# Patient Record
Sex: MALE | Race: WHITE | Employment: STUDENT | URBAN - METROPOLITAN AREA
[De-identification: names, ages, dates, MRNs, and addresses within clinical notes are randomized per-mention and may not be internally consistent; named-entity substitution may affect disease eponyms.]

---
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
## 2025-06-03 NOTE — PROGRESS NOTES
Chief Complaint   Patient presents with    [facility]      Physical     Annual        HPI:  History of Present Illness  Tod Serrano is a 24 year old male with autism who presents with depression and social isolation.    He experiences persistent depression characterized by anhedonia and feelings of social isolation. He lacks friendships and feels 'invisible' both online and in person. Gardening is one of the few activities that brings him jonelle. His depressive symptoms are consistent throughout the year and are not significantly affected by the school year.    He is currently taking 30 mg of fluoxetine daily, which has helped stabilize his depressive symptoms. He has been on this medication for a few years with a stable dose. Additionally, he takes 35 mg of methylphenidate daily to aid focus, which is challenging due to his autism diagnosis. He previously used Concerta but switched to Quillivant due to side effects of feeling 'jumpy'.    He experiences significant sensitivity to overcast weather, which causes lethargy and cognitive clouding. He covers windows to avoid the sight of the pebbles when it is overcast, as it feels like a 'pressure' to him.    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 There was monitoring post-surgery, but the tumor did not recur.    He has itchy skin, particularly around his wrists and arms, which worsens in the winter. He uses over-the-counter lotions like Eucerin to manage the symptoms, which he describes as eczema-like.    Socially, he struggles to find a community that shares his values and interests, which he attributes to his autism. He has been involved in school clubs in the past but found them unappealing. He is currently studying environmental science and is interested in working for government agencies or conservation organizations after graduation. He does not smoke, vape, or consume  alcohol regularly, though he had a pomegranate cocktail on his 23st birthday and occasionally tastes wine.      Health Maintenance:  Vaccines: reviewed as below.   Indicated today:Tdap  Obesity screening: Body mass index is 20.63 kg/m².  Diabetes screening:   Chemistry Labs:   No results found for: \"A1C\"      Hypercholesterolemia screening: No Lipid panel on file.      Depression screening:     Depression Screening (PHQ-2/PHQ-9): Over the LAST 2 WEEKS   Little interest or pleasure in doing things: More than half the days    Feeling down, depressed, or hopeless: More than half the days    PHQ-2 SCORE: 4       Desire for STI testing? no.  Tobacco use?  reports that he has never smoked. He has never used smokeless tobacco.    ROS:   Review of Systems     HISTORY:  Past Medical History[1]   Past Surgical History[2]   Family History[3]   Short Social Hx on File[4]     Allergies:  Allergies[5]    OBJECTIVE:  PHYSICAL EXAM:  Vitals:    06/03/25 1555   BP: 118/72   Pulse: 76   Resp: 16   SpO2: 96%   Weight: 133 lb 12.8 oz (60.7 kg)   Height: 5' 7.52\" (1.715 m)     General appearance: alert, appears stated age, and cooperative  Head: Normocephalic, without obvious abnormality, atraumatic  Ears: normal TM's and external ear canals both ears  Neck: no adenopathy, no carotid bruit, no JVD, supple, symmetrical, trachea midline, and thyroid not enlarged, symmetric, no tenderness/mass/nodules  Lungs: clear to auscultation bilaterally  Heart: S1, S2 normal, no murmur, click, rub or gallop, regular rate and rhythm  Abdomen: soft, non-tender; bowel sounds normal; no masses,  no organomegaly  Extremities: extremities normal, atraumatic, no cyanosis or edema    Results  PATHOLOGY  Lymphoma biopsy: Benign lymphoid hyperplasia    ASSESSMENT & PLAN:  Tod Serrano is a 24 year old male is here for Establish Care (New pt/) and Physical (Annual )    1. Routine health maintenance (Primary)  -     CBC With Differential With Platelet;  Future; Expected date: 06/03/2025  -     Comp Metabolic Panel (14); Future; Expected date: 06/03/2025  -     Hemoglobin A1C; Future; Expected date: 06/03/2025  -     Lipid Panel; Future; Expected date: 06/03/2025  -     TSH W Reflex To Free T4; Future; Expected date: 06/03/2025  2. Flexural eczema  -     Triamcinolone Acetonide; Apply 1 Application topically 2 (two) times daily as needed.  Dispense: 30 g; Refill: 1    Assessment & Plan  Autism Spectrum Disorder  Diagnosed in childhood, currently managed with methylphenidate for focus and fluoxetine for depressive symptoms. Symptoms include difficulty with social interactions and anhedonia, exacerbated by overcast weather. Current medication regimen is managed by Dr. Erlin Chaudhry at [facility]. Overcast weather significantly impacts mood and functionality.  - Continue methylphenidate and fluoxetine  - Explore additional support for social interactions and community engagement  - Discuss potential benefits of light therapy for weather-related mood changes    Depression  Chronic depressive symptoms with anhedonia and social isolation. Symptoms are well-managed with fluoxetine but worsen with overcast weather. Gardening remains an enjoyable activity.  - Continue fluoxetine 30 mg daily  - Consider light therapy for weather-related mood changes  - Encourage engagement in enjoyable activities such as gardening    Eczema  Chronic eczema with recent exacerbation on wrists and arms, worsened by cold weather. Currently using over-the-counter lotions with partial relief. Reports significant pruritus, especially during winter.  - Prescribe topical steroid cream for application twice daily to affected areas until resolved  - Continue Eucerin lotion for skin hydration  - Educate on avoiding scratching to prevent exacerbation    General Health Maintenance  Vaccinations are up to date, including COVID-19. Tetanus booster is due, with increased risk due to gardening  activities.  - Order Tdap booster  - Schedule nurse visit for Tdap booster and routine blood work  - Consider drive-through location for convenience    Recording duration: 16 minutes    Call or return to clinic prn if these symptoms worsen or fail to improve as anticipated. RTC in 1 year    Latosha Donovan  6/3/2025 4:33 PM  Family Medicine    Note to Patient  The 21st Century Cures Act makes medical notes like these available to patients in the interest of transparency. However, be advised this is a medical document and is intended as [id number] communication; it is written in medical language and may appear blunt, direct, or contain abbreviations or verbiage that are unfamiliar. Medical documents are intended to carry relevant information, facts as evident, and the clinical opinion of the practitioner.     This report has been produced using speech recognition software and AI generated text, and may contain errors related to grammar, punctuation, spelling, words or phrases unrecognized or not translated appropriately to text; these errors may be referred to the dictating provider for further clarification and/or addendum as needed.         [1]   Past Medical History:   Autism (HCC)   [2]   Past Surgical History:  Procedure Laterality Date    Other surgical history Left     bone tumor, benign   [3]   Family History  Problem Relation Age of Onset    Cancer Maternal Grandfather         Bladder    Cancer Paternal Grandmother    [4]   Social History  Socioeconomic History    Marital status: Single   Tobacco Use    Smoking status: Never    Smokeless tobacco: Never   Substance and Sexual Activity    Alcohol use: Not Currently    Drug use: Never   [5] No Known Allergies

## 2025-06-03 NOTE — PROGRESS NOTES
The following individual(s) verbally consented to be recorded using ambient AI listening technology and understand that they can each withdraw their consent to this listening technology at any point by asking the clinician to turn off or pause recording:    Patient name: Tod Serrano  Additional names:  Gabe Serrano